# Patient Record
Sex: FEMALE | Race: WHITE | Employment: FULL TIME | URBAN - METROPOLITAN AREA
[De-identification: names, ages, dates, MRNs, and addresses within clinical notes are randomized per-mention and may not be internally consistent; named-entity substitution may affect disease eponyms.]

---

## 2017-04-12 ENCOUNTER — HOSPITAL ENCOUNTER (OUTPATIENT)
Dept: MAMMOGRAPHY | Age: 42
Discharge: HOME | End: 2017-04-12
Attending: OBSTETRICS & GYNECOLOGY | Admitting: OBSTETRICS & GYNECOLOGY

## 2017-04-12 DIAGNOSIS — Z12.31 VISIT FOR SCREENING MAMMOGRAM: ICD-10-CM

## 2018-08-14 ENCOUNTER — OFFICE VISIT (OUTPATIENT)
Dept: ORTHOPEDIC SURGERY | Age: 43
End: 2018-08-14

## 2018-08-14 VITALS
SYSTOLIC BLOOD PRESSURE: 112 MMHG | HEART RATE: 64 BPM | BODY MASS INDEX: 19.77 KG/M2 | WEIGHT: 123 LBS | HEIGHT: 66 IN | DIASTOLIC BLOOD PRESSURE: 70 MMHG

## 2018-08-14 DIAGNOSIS — S93.402A MODERATE ANKLE SPRAIN, LEFT, INITIAL ENCOUNTER: Primary | ICD-10-CM

## 2018-08-14 PROCEDURE — L1902 AFO ANKLE GAUNTLET PRE OTS: HCPCS | Performed by: PODIATRIST

## 2018-08-14 PROCEDURE — 99203 OFFICE O/P NEW LOW 30 MIN: CPT | Performed by: PODIATRIST

## 2018-08-14 RX ORDER — DROSPIRENONE, ETHINYL ESTRADIOL AND LEVOMEFOLATE CALCIUM AND LEVOMEFOLATE CALCIUM 3-0.02(24)
KIT ORAL
COMMUNITY

## 2018-08-14 RX ORDER — DESLORATADINE 5 MG/1
TABLET ORAL
Refills: 5 | COMMUNITY
Start: 2018-06-14

## 2018-08-14 NOTE — PROGRESS NOTES
correctly and will use it full-time with a supportive shoe. I prescribed physical therapy. The patient prefers to do that outside of [facility] AND [facility]. Rest, ice, and elevation are to be used to relieve pain. Overall activity is to be decreased  in the short-term. I will see her back after physical therapy. Procedures    [employer] Air Sport Ankle Brace     Patient was prescribed an [employer] Air Sport Brace. The left ankle will require stabilization / immobilization from this semi-rigid / rigid orthosis to improve their function. The orthosis will assist in protecting the affected area, provide functional support and facilitate healing. Patient was instructed to progress ambulation weight bearing as tolerated in the device. The patient was educated and fit by a healthcare professional with expert knowledge and specialization in brace application while under the direct supervision of the treating physician. Verbal and written instructions for the use of and application of this item were provided. They were instructed to contact the office immediately should the brace result in increased pain, decreased sensation, increased swelling or worsening of the condition.

## 2019-04-18 ENCOUNTER — HOSPITAL ENCOUNTER (OUTPATIENT)
Dept: MAMMOGRAPHY | Age: 44
Discharge: HOME | End: 2019-04-23
Payer: COMMERCIAL

## 2019-04-18 DIAGNOSIS — Z12.31 VISIT FOR SCREENING MAMMOGRAM: ICD-10-CM

## 2019-04-18 PROCEDURE — 77067 SCR MAMMO BI INCL CAD: CPT
